# Patient Record
Sex: FEMALE | Race: WHITE
[De-identification: names, ages, dates, MRNs, and addresses within clinical notes are randomized per-mention and may not be internally consistent; named-entity substitution may affect disease eponyms.]

---

## 2016-02-11 VITALS — DIASTOLIC BLOOD PRESSURE: 64 MMHG | SYSTOLIC BLOOD PRESSURE: 122 MMHG

## 2018-12-24 ENCOUNTER — HOSPITAL ENCOUNTER (OUTPATIENT)
Dept: HOSPITAL 61 - US | Age: 73
Discharge: HOME | End: 2018-12-24
Attending: INTERNAL MEDICINE
Payer: MEDICARE

## 2018-12-24 DIAGNOSIS — K76.0: Primary | ICD-10-CM

## 2018-12-24 PROCEDURE — 78227 HEPATOBIL SYST IMAGE W/DRUG: CPT

## 2018-12-24 PROCEDURE — 76700 US EXAM ABDOM COMPLETE: CPT

## 2018-12-24 PROCEDURE — A9537 TC99M MEBROFENIN: HCPCS

## 2018-12-24 NOTE — RAD
CLINICAL HISTORY: EPIGASTRIC PAIN/BELCHING

 

COMPARISON: None available.

 

TECHNIQUE: Ultrasound of the upper abdomen was performed.

 

FINDINGS:

The liver measures 17 cm in length in the right mid clavicular line.  The 

hepatic margin is smooth.  Increased echogenicity of the liver consistent 

with hepatic steatosis. No focal liver lesion.  Flow is seen within the 

portal vein. 

 

The gallbladder is normal in appearance without evidence for 

cholelithiasis.  There is no wall thickening or pericholecystic fluid.  

There is no pain with direct transducer pressure over the gallbladder.

 

The common bile duct is not well seen.

 

The spleen is normal in size measuring 9.3 cm in length.

 

 The head and body of the pancreas are unremarkable.  The tail is obscured

by intestinal gas..

 

The right kidney measures 11.7 cm in bipolar length. The left kidney 

measures 11.6 cm in length. Normal renal cortical echogenicity bilaterally

without evidence of focal lesion or hydronephrosis.

 

Visualized portions of the abdominal aorta and inferior vena cava are 

unremarkable.

 

There is  no free fluid in the upper abdomen.

 

IMPRESSION: 

1.  Hepatic steatosis

2.  Otherwise normal sonographic survey of the upper abdomen.

 

Electronically signed by: Denny Melo MD (12/24/2018 8:42 AM) [facility]

## 2018-12-24 NOTE — RAD
Radionuclide hepatobiliary scan, 12/24/2018:

 

HISTORY: Chronic epigastric pain

 

Following IV injection of 5.0 mCi of technetium 99m Choletec there was 

prompt uptake of the radionuclide from the blood stream by the liver. 

Activity is present in the bile ducts and gallbladder at 10 minutes. 

Initial imaging over 1 hour showed increasing activity in the gallbladder 

without extension into the small bowel. This is not considered abnormal. 

Additional imaging was then performed following IV injection of 1.4 mcg of

cholecystokinin. The gallbladder ejection fraction was calculated at 34 

percent. 30-50 percent is considered to be the borderline low range.

 

IMPRESSION:

1. No evidence of cystic duct or common bile duct obstruction.

2. The gallbladder ejection fraction was 34 percent.

 

Electronically signed by: Rick Moritz, MD (12/24/2018 9:53 AM) [facility]

## 2019-03-05 ENCOUNTER — HOSPITAL ENCOUNTER (OUTPATIENT)
Dept: HOSPITAL 61 - SURG | Age: 74
Discharge: HOME | End: 2019-03-05
Attending: SURGERY
Payer: MEDICARE

## 2019-03-05 VITALS
DIASTOLIC BLOOD PRESSURE: 69 MMHG | DIASTOLIC BLOOD PRESSURE: 69 MMHG | SYSTOLIC BLOOD PRESSURE: 115 MMHG | SYSTOLIC BLOOD PRESSURE: 115 MMHG

## 2019-03-05 VITALS — WEIGHT: 158 LBS | BODY MASS INDEX: 26.98 KG/M2 | HEIGHT: 64 IN

## 2019-03-05 DIAGNOSIS — Z80.3: ICD-10-CM

## 2019-03-05 DIAGNOSIS — E03.9: ICD-10-CM

## 2019-03-05 DIAGNOSIS — Z88.8: ICD-10-CM

## 2019-03-05 DIAGNOSIS — Z72.89: ICD-10-CM

## 2019-03-05 DIAGNOSIS — Z80.0: ICD-10-CM

## 2019-03-05 DIAGNOSIS — Z79.899: ICD-10-CM

## 2019-03-05 DIAGNOSIS — Z88.2: ICD-10-CM

## 2019-03-05 DIAGNOSIS — E78.00: ICD-10-CM

## 2019-03-05 DIAGNOSIS — Z90.710: ICD-10-CM

## 2019-03-05 DIAGNOSIS — K80.10: Primary | ICD-10-CM

## 2019-03-05 DIAGNOSIS — Z98.890: ICD-10-CM

## 2019-03-05 PROCEDURE — A7015 AEROSOL MASK USED W NEBULIZE: HCPCS

## 2019-03-05 PROCEDURE — 47563 LAPARO CHOLECYSTECTOMY/GRAPH: CPT

## 2019-03-05 PROCEDURE — 74300 X-RAY BILE DUCTS/PANCREAS: CPT

## 2019-03-05 PROCEDURE — 88304 TISSUE EXAM BY PATHOLOGIST: CPT

## 2019-03-05 NOTE — RAD
Examination: CHOLANGIOGRAM INTRAOPERATIVE

 

History: FL TIME =.3 MINUTES, 5 IMAGES SENT, CHOLANGIOGRAMS IN OR WITH 

C-ARM 

 

Comparison/Correlation: None

 

Findings: A total of 5 images from intraoperative cholangiographic 

procedure were provided. Fluoroscopy was reportedly utilized for 0.3 

minutes.

 

Contrast is noted within the common bile duct. Cholecystectomy clips 

noted. No suspicious filling defect identified involving the common bile 

duct or the common hepatic duct. Cystic duct has a tortuous appearance but

appears unremarkable. No extravasation of contrast at the site of the 

cholecystectomy clips.

 

 

Impression:

No stricture or suspicious filling defect involving the common bile duct. 

No extravasation of contrast.

 

Electronically signed by: Gurmeet Vera MD (3/5/2019 8:42 AM) [id number]

## 2019-03-05 NOTE — DISCH
DISCHARGE INSTRUCTIONS


Condition on Discharge


Condition on Discharge:  Unstable





Activity After Discharge


Activity Instructions for Disc:  Other, see below (no lifting over 20 lbs X 2 

weeks)





Diet after Discharge


Diet after Discharge:  Regular





Wound Incision Care


Wound/Incision Care:  Other, see below (may remove bandaids and shower tomorrow)





Follow-Up


Follow up with:  Dr Quintero in 2 weeks in office, call for appt [phone]











EMILY QUINTERO MD Mar 5, 2019 08:55

## 2019-03-05 NOTE — PDOC4
Operative Note


Operative Note


Operative Note:





Preoperative Diagnosis: Biliary dyskinesia





Postoperative Diagnosis: Same





Procedure: Laparoscopic cholecystectomy with intraoperative cholangiogram





Surgeons: Faisal





Anesthesia: Gen.





Estimated Blood Loss: 5 mL





Specimen: Gallbladder to pathology





Drains: None





Complications: None





Indications: The patient is a 73-year-old female who was evaluated for 

abdominal pain. A PIPIDA scan showed a borderline low gallbladder ejection 

fraction suggesting possible biliary dyskinesia.   Surgical treatment was 

offered by means of a laparoscopic cholecystectomy.  The risks of surgery were 

discussed which include bleeding, infection, bile duct injury, bile leak, pain, 

the potential for additional surgeries or procedures.  The patient understands 

and would like to proceed.





Description:  The patient was taken to the operating room and laid supine on 

the operating table.  General anesthesia was performed.  The abdomen was 

prepped with ChloraPrep and draped in a standard surgical fashion.  A small 

infraumbilical incision was made with a scalpel.  The Veress needle was then 

inserted and a pneumoperitoneum was then created.  A  5 mm trocar was then 

inserted and the laparoscope was introduced.  In the upper midabdomen a 5 mm 

trocar was inserted and in the right upper quadrant two 2.3 mm mini lap 

graspers were inserted.  The gallbladder was retracted cephalad.  The cystic 

duct was dissected free from surrounding tissues.  One clip was placed on the 

duct near the gallbladder junction.  An opening was made in the duct and a 

cholangiocatheter placed within and secured with a clip.  Using contrast dye 

and fluoroscopy an intraoperative cholangiogram was performed that appeared 

unremarkable.  The clip and catheter were then withdrawn.  Three clips were 

placed on the cystic duct and it was divided.  The cystic artery was then 

identified, dissected free, doubly clipped and divided as well.  The 

gallbladder was then mobilized away from the liver with cautery.  The umbilical 

5 millimeter trocar was exchanged for an 11 millimeter trocar.  The gallbladder 

was then placed in an endoscopic bag and extracted at the umbilical trocar 

site.  The fascia there was closed with an 0 Vicryl suture.  All blood and 

irrigation fluid was suctioned and hemostasis was good.  The remaining ports 

were removed and the pneumoperitoneum was relieved.  The skin incisions were 

injected with half percent Marcaine with epinephrine, and all were closed using 

4-0 Monocryl suture.  Steri-Strips and dressings were then applied.  The 

patient tolerated the procedure well and was sent to the recovery room in 

stable condition.  At the end of the case all counts were correct.











EMILY QUINTERO MD Mar 5, 2019 08:53

## 2019-03-06 NOTE — PATHOLOGY
[facility] Accession Number: [id number]

.                                                                01

Material submitted:                                        .

GALLBLADDER

.                                                                01

Clinical history:                                          .

Cholelithiasis

.                                                                02

**********************************************************************

Diagnosis:

Gallbladder, cholecystectomy:

- Cholelithiasis.

- Chronic cholecystitis.

(M:elieser; 03/06/2019)

MBR/03/06/2019

**********************************************************************

.                                                                02

Comment:

There is no evidence of malignancy.

(MONICAM:elieser; 03/06/2019)

.                                                                02

Electronically signed:                                     .

Duglas Donis MD, Pathologist

NPI- [id number]

.                                                                01

Gross description:                                         .

The specimen is received in formalin, labeled "Mi Bolden, gallbladder,

is an intact gallbladder measuring 8.2 x 2.7 x 2.2 cm with a wrinkled,

tan-purple serosa.  The lumen is filled with yellow-green viscous bile

admixed with minute black sand-like material approximately measuring 1.0 cm

in greatest dimension.  The mucosa is tan-brown and the wall has an

average thickness of 0.1 cm.  No discrete masses are identified.

Representative tissue is submitted in A1.

([facility]; 03/05/2019)

SHS/SHS

.                                                                02

Pathologist provided ICD-10:

K80.10

.                                                                02

CPT                                                        .

257739

Specimen Comment: A courtesy copy of this report has been sent to

Specimen Comment: [phone], [phone].

Specimen Comment: Report sent to  / DR SENIOR

***Performed at:  01

   [facility] [id number] [facility] [address], [place]  [id number]

   MD Omari Magallon MD Phone:  [phone]

***Performed at:  02

   [facility]  [id number]

   MD Duglas Donis MD Phone:  [phone]

## 2019-12-02 ENCOUNTER — HOSPITAL ENCOUNTER (OUTPATIENT)
Dept: HOSPITAL 61 - KCIC CT | Age: 74
Discharge: HOME | End: 2019-12-02
Attending: INTERNAL MEDICINE
Payer: MEDICARE

## 2019-12-02 DIAGNOSIS — K44.9: Primary | ICD-10-CM

## 2019-12-02 DIAGNOSIS — Z90.49: ICD-10-CM

## 2019-12-02 PROCEDURE — 82565 ASSAY OF CREATININE: CPT

## 2019-12-02 PROCEDURE — 74177 CT ABD & PELVIS W/CONTRAST: CPT

## 2019-12-02 NOTE — KC[facility]
CT abdomen pelvis Wo contrast dated 12/2/2019.

 

No comparison available.

 

Clinical data indication: Epigastric pain and weight loss.

 

TECHNIQUE:

 

Contiguous axial imaging of the abdomen and pelvis performed after the 

administration of 89 cc Omnipaque 300. 

One or more of the following individualized dose reduction techniques were

utilized for this examination:  

1. Automated exposure control  

2. Adjustment of the mA and/or kV according to patient size  

3. Use of iterative reconstruction technique.

 

FINDINGS:

 

Limited images of lung bases are clear. Heart size within normal limits. 

Large hiatal hernia.

 

Liver, spleen, pancreas, adrenal glands and kidneys are unremarkable. No 

hydronephrosis. The gallbladder is surgically absent. There is a small 

well-circumscribed low-density focus within the medial spleen that 

measures 1.3 cm, nonspecific.

 

Partially opacified GI tract normal in caliber and contour. No focal bowel

wall thickening. No inflammatory stranding in the mesentery. The appendix 

is normal in caliber. No ascites or lymphadenopathy. Possible area of mild

wall thickening involving the ascending colon versus peristaltic activity 

on axial images 32 through 40. No ascites or lymphadenopathy. Abdominal 

aorta normal in caliber.

 

Images of pelvis show nondistended urinary bladder. The uterus is 

surgically absent. Scattered diverticula throughout the colon. No 

paracolonic inflammatory changes. No free fluid or pelvic lymphadenopathy.

 

Bone windows show no acute findings.

 

IMPRESSION:

1. No acute abnormality of abdomen or pelvis. Normal appendix.

2. Possible short segment wall thickening of the ascending colon versus 

normal peristaltic activity. Correlate with recent Colonoscopy results.

3. Status post cholecystectomy and hysterectomy.

4. Small low-density lesion in the medial spleen, nonspecific.

5. Large hiatal hernia.

 

Electronically signed by: Eulogio Moran MD (12/2/2019 2:09 PM) 

[facility]

## 2020-01-09 ENCOUNTER — HOSPITAL ENCOUNTER (OUTPATIENT)
Dept: HOSPITAL 61 - ENDOS | Age: 75
End: 2020-01-09
Attending: INTERNAL MEDICINE
Payer: MEDICARE

## 2020-01-09 VITALS — DIASTOLIC BLOOD PRESSURE: 65 MMHG | SYSTOLIC BLOOD PRESSURE: 147 MMHG

## 2020-01-09 DIAGNOSIS — K44.9: ICD-10-CM

## 2020-01-09 DIAGNOSIS — E03.9: ICD-10-CM

## 2020-01-09 DIAGNOSIS — K31.7: ICD-10-CM

## 2020-01-09 DIAGNOSIS — Z88.8: ICD-10-CM

## 2020-01-09 DIAGNOSIS — Z90.49: ICD-10-CM

## 2020-01-09 DIAGNOSIS — Z90.710: ICD-10-CM

## 2020-01-09 DIAGNOSIS — K57.30: ICD-10-CM

## 2020-01-09 DIAGNOSIS — D12.0: ICD-10-CM

## 2020-01-09 DIAGNOSIS — K64.0: ICD-10-CM

## 2020-01-09 DIAGNOSIS — Z88.1: ICD-10-CM

## 2020-01-09 DIAGNOSIS — R11.0: Primary | ICD-10-CM

## 2020-01-09 DIAGNOSIS — E78.5: ICD-10-CM

## 2020-01-09 DIAGNOSIS — Z86.010: ICD-10-CM

## 2020-01-09 PROCEDURE — 88342 IMHCHEM/IMCYTCHM 1ST ANTB: CPT

## 2020-01-09 PROCEDURE — 45385 COLONOSCOPY W/LESION REMOVAL: CPT

## 2020-01-09 PROCEDURE — 43239 EGD BIOPSY SINGLE/MULTIPLE: CPT

## 2020-01-09 PROCEDURE — 88305 TISSUE EXAM BY PATHOLOGIST: CPT

## 2020-01-09 PROCEDURE — 43251 EGD REMOVE LESION SNARE: CPT

## 2020-01-09 PROCEDURE — 88360 TUMOR IMMUNOHISTOCHEM/MANUAL: CPT

## 2020-01-09 PROCEDURE — 88341 IMHCHEM/IMCYTCHM EA ADD ANTB: CPT

## 2020-01-10 NOTE — CONS
DATE OF CONSULTATION:  01/09/2020



REFERRING PHYSICIAN:  Gopi To MD



REASON FOR CONSULTATION:  Abdominal pain, weight loss, abnormal CAT scan.



HISTORY OF PRESENT ILLNESS:  This 74-year-old  female whose past

medical history is significant for colonic polyps, diverticulosis,

hyperlipidemia, hypothyroidism is seen with epigastric abdominal pain associated

with belching, bloating and nausea.  Prior cholecystectomy is noted.  CT scan

did reveal possible thickening of the ascending colon versus spontaneous

contraction.  Interval colonoscopy and upper endoscopy are recommended to

further assess.



PAST MEDICAL HISTORY:

1.  Colonic diverticulosis, polyps.

2.  Hyperlipidemia.

3.  Hypothyroidism.



ALLERGIES:  SULFA and PROMETHAZINE.



MEDICATIONS:  Include:

1.  Acyclovir.

2.  Atorvastatin.

3.  Calcium.

4.  Docusate.

5.  Levothyroxine.

6.  Multivitamin.

7.  Oxybutynin.

8.  Vitamin D.



FAMILY HISTORY:  Significant for colon cancer with her father, cardiac disease

with grandfather, cerebrovascular accident with her mother.



PAST SURGICAL HISTORY:  Status post hysterectomy and cholecystectomy.



REVIEW OF SYSTEMS:  Per records.



PHYSICAL EXAMINATION:

GENERAL:  Reveals a well-nourished, well-developed  female who is

alert, cooperative, in no acute distress.

VITAL SIGNS:  Temperature 97, pulse 80, respirations 20.

HEENT:  Reveals normocephalic, atraumatic head.  Pupils and extraocular muscles

are not tested.  Sclerae anicteric.

NECK:  Supple.

LUNGS:  Clear.

CARDIOVASCULAR:  Reveals S1, S2 without S3, S4 or appreciable murmur.

ABDOMEN:  Soft abdomen, normal bowel sounds, without appreciable

hepatosplenomegaly, with diffuse tenderness.

EXTREMITIES:  Reveal no cyanosis, clubbing or edema.



IMPRESSION:  Abdominal pain, status post cholecystectomy, with weight loss,

abnormal CAT scan.  Upper endoscopy and colonoscopy were recommended to further

assess.  Risks and benefits of the procedures including risk of hemorrhage and

perforation requiring operation were discussed.  The patient is willing to

proceed at this time.

 



______________________________

EMILY BRADFORD MD



DR:  SSP/mariama  JOB#:  [id number] / [id number]

DD:  01/09/2020 13:15  DT:  01/10/2020 00:38

## 2020-01-13 NOTE — PATHOLOGY
[facility] Accession Number: [id number]

.                                                                01

Material submitted:                                        .

PART A: duodenum - DUODENAL POLYP

PART B: hepatic flexure - HEPATIC FLEXURE POLYP

.                                                                01

Clinical history:                                          .

Weight loss, abdominal pain

.                                                                02

**********************************************************************

Diagnosis:

A. Duodenal biopsy, duodenal polyp:

- Well-differentiated neuroendocrine tumor, grade I.  See comment.

-Mild nonspecific duodenitis.

.

B. Colon biopsies, hepatic flexure polyp:

- Tubular adenoma.

(JPM/db/brian; 1/10/2020)

LBQ  01/13/2020  1156 Local

**********************************************************************

.                                                                02

Comment:

Sections of the duodenal polyp biopsy reveal several nests of uniform

cells within the lamina propria which have eosinophilic granular cytoplasm

and possess rounded to slightly ovoid regular appearing nuclei.  A panel

of immunoperoxidase stains is obtained on block A1 and yields the

following results:

.

Synaptophysin:  Tumor cells positive.

Chromogranin:  Tumor cells positive.

CD56:  Tumor cells positive.

Ki-67:  Tumor shows low proliferation index (less than 3%).

.

The morphologic and immunophenotypic findings are supportive of the

diagnosis of well-differentiated neuroendocrine tumor, grade I.  The tumor

measures approximately 2 mm in greatest dimension on the glass slide.

.

Sections of the hepatic flexure biopsy reveal a tubular adenoma showing no

high grade dysplasia or evidence of malignancy.

.

The case is also examined by Dr. Perry, who concurs with the

diagnosis.

(JPM:brian; 01/13/2020)

.

.

Special stains performed:  Immunoperoxidase stains for CD56,

synaptophysin, chromograin, and Ki-67.

.                                                                02

Electronically signed:                                     .

Duglas Donis MD, Pathologist

NPI- [id number]

.                                                                01

Gross description:                                         .

A.  Received in formalin labeled "Yuan Mi, duodenal polyp," is a

single segment of tan soft tissue measuring 0.5 cm in maximum dimension.

The specimen is entirely submitted in cassette A1.

.

B.  Received in formalin labeled "Yuan, Mi, hepatic flexure polyp," is

a single segment of tan soft tissue measuring 0.3 cm in maximum dimension.

 The specimen is entirely submitted in cassette B1.

([facility]; 1/9/2020)

TOB/TOB  01/09/2020 2023 Local

.                                                                02

Pathologist provided ICD-10:

D3A.8, D12.3

.                                                                02

CPT                                                        .

700024, 387836, H02551, N95502, 991206

Specimen Comment: A courtesy copy of this report has been sent to [phone], [phone]

Specimen Comment: [id number]

Specimen Comment: Report sent to  / DR SENIOR

***Performed at:  01

   [facility]

   [address] [facility] [facility] [address], [place]  [id number]

   MD Omari Magallon MD Phone:  [phone]

***Performed at:  02

   [facility]

   [address], [place]  [id number]

   MD Duglas Donis MD Phone:  [phone]